# Patient Record
Sex: MALE | Race: WHITE | NOT HISPANIC OR LATINO | Employment: FULL TIME | URBAN - METROPOLITAN AREA
[De-identification: names, ages, dates, MRNs, and addresses within clinical notes are randomized per-mention and may not be internally consistent; named-entity substitution may affect disease eponyms.]

---

## 2017-08-31 ENCOUNTER — ALLSCRIPTS OFFICE VISIT (OUTPATIENT)
Dept: OTHER | Facility: OTHER | Age: 27
End: 2017-08-31

## 2017-10-20 ENCOUNTER — ALLSCRIPTS OFFICE VISIT (OUTPATIENT)
Dept: OTHER | Facility: OTHER | Age: 27
End: 2017-10-20

## 2018-01-11 NOTE — PROGRESS NOTES
Assessment    1  Encounter for preventive health examination (V70 0) (Z00 00)    Plan  [facility] is a fine [facility]  He will contact me if he needs anything between now and boot camp  I wished him well, and will see him if he returns to the [facility]  Chief Complaint  physical      History of Present Illness  HM, Adult Male: The patient is being seen for a health maintenance evaluation  The last health maintenance visit was 1 year(s) ago  General Health: The patient's health since the last visit is described as good  He has regular dental visits  He denies vision problems  He denies hearing loss  Immunizations status: not up to date  Lifestyle:  He consumes a diverse and healthy diet  He does not have any weight concerns  He exercises regularly  He does not use tobacco  He consumes alcohol  He denies drug use  Reproductive health:  the patient is sexually active  Screening:      Review of Systems    Constitutional: No fever or chills, feels well, no tiredness, no recent weight gain or weight loss  Eyes: No complaints of eye pain, no red eyes, no discharge from eyes, no itchy eyes  ENT: no complaints of earache, no hearing loss, no nosebleeds, no nasal discharge, no sore throat, no hoarseness  Cardiovascular: No complaints of slow heart rate, no fast heart rate, no chest pain, no palpitations, no leg claudication, no lower extremity  Respiratory: No complaints of shortness of breath, no wheezing, no cough, no SOB on exertion, no orthopnea or PND  Gastrointestinal: No complaints of abdominal pain, no constipation, no nausea or vomiting, no diarrhea or bloody stools  Genitourinary: No complaints of dysuria, no incontinence, no hesitancy, no nocturia, no genital lesion, no testicular pain  Musculoskeletal: No complaints of arthralgia, no myalgias, no joint swelling or stiffness, no limb pain or swelling     Integumentary: No complaints of skin rash or skin lesions, no itching, no skin wound, no dry skin  Neurological: No compliants of headache, no confusion, no convulsions, no numbness or tingling, no dizziness or fainting, no limb weakness, no difficulty walking  Psychiatric: Is not suicidal, no sleep disturbances, no anxiety or depression, no change in personality, no emotional problems  Endocrine: No complaints of proptosis, no hot flashes, no muscle weakness, no erectile dysfunction, no deepening of the voice, no feelings of weakness  Hematologic/Lymphatic: No complaints of swollen glands, no swollen glands in the neck, does not bleed easily, no easy bruising  Past Medical History    · History of bradycardia (V12 50) (Z86 79)   · History of hyperglycemia (V12 29) (Z86 39)   · History of Near syncope (780 2) (R55)    Surgical History    · History of Elective Circumcision   · History of Laparoscopic Appendectomy   · History of Open Treatment Of Fracture Of One Metacarpal Bone   · History of Tonsillectomy With Adenoidectomy    Family History  Mother    · Family history of Myocardial infarction involving other coronary artery   · Family history of Primary fibromyalgia  Maternal Grandmother    · Family history of type 2 diabetes mellitus (V18 0) (Z83 3)  Maternal Grandfather    · Family history of type 2 diabetes mellitus (V18 0) (Z83 3)   · Family history of Myocardial infarction involving other coronary artery  Family History    · Family history of hypertension (V17 49) (Z82 49)    Social History    · Alcohol use (V49 89) (Z78 9)   · Chewing tobacco use (305 1) (Z72 0)   · Denied: History of Drug use   · Never a smoker   · Pets/Animals: Dog   · Single    Current Meds   1  No Reported Medications Recorded    Allergies    1   Penicillins    Vitals   Recorded: [id number] 40:45NM   Systolic 555   Diastolic 78   Height 5 ft 9 in   Weight 193 lb    BMI Calculated 28 5   BSA Calculated 2 03     Physical Exam    Constitutional   General appearance: No acute distress, well appearing and well nourished  Eyes   Conjunctiva and lids: No erythema, swelling or discharge  Pupils and irises: Equal, round, reactive to light  Ears, Nose, Mouth, and Throat   External inspection of ears and nose: Normal     Otoscopic examination: Tympanic membranes translucent with normal light reflex  Canals patent without erythema  Hearing: Normal     Nasal mucosa, septum, and turbinates: Normal without edema or erythema  Lips, teeth, and gums: Normal, good dentition  Oropharynx: Normal with no erythema, edema, exudate or lesions  Neck   Neck: Supple, symmetric, trachea midline, no masses  Thyroid: Normal, no thyromegaly  Pulmonary   Respiratory effort: No increased work of breathing or signs of respiratory distress  Percussion of chest: Normal     Palpation of chest: Normal     Auscultation of lungs: Clear to auscultation  Cardiovascular   Palpation of heart: Normal PMI, no thrills  Auscultation of heart: Normal rate and rhythm, normal S1 and S2, no murmurs  Carotid pulses: 2+ bilaterally  Abdominal aorta: Normal     Femoral pulses: 2+ bilaterally  Pedal pulses: 2+ bilaterally  Examination of extremities for edema and/or varicosities: Normal     Chest   Breasts: Normal, no dimpling or skin changes appreciated  Palpation of breasts and axillae: Normal, no masses palpated  Chest: Normal     Abdomen   Abdomen: Non-tender, no masses  Liver and spleen: No hepatomegaly or splenomegaly  Examination for hernias: No hernias appreciated  Lymphatic   Palpation of lymph nodes in neck: No lymphadenopathy  Musculoskeletal   Gait and station: Normal     Inspection/palpation of digits and nails: Normal without clubbing or cyanosis  Inspection/palpation of joints, bones, and muscles: Normal     Range of motion: Normal     Stability: Normal     Muscle strength/tone: Normal     Skin   Skin and subcutaneous tissue: Normal without rashes or lesions      Palpation of skin and subcutaneous tissue: Normal turgor  Neurologic   Cranial nerves: Cranial nerves 2-12 intact  Reflexes: 2+ and symmetric  Sensation: No sensory loss  Psychiatric   Judgment and insight: Normal     Orientation to person, place and time: Normal     Recent and remote memory: Intact  Mood and affect: Normal        Results/Data  Reviewed records from his hospitalization in 2009       Signatures   Electronically signed by : Leola Chen DO; Oct 20 2017  1:36PM EST                       (Author)

## 2018-01-13 NOTE — RESULT NOTES
Verified Results  (Q) CBC (H/H, RBC, INDICES, WBC, PLT) [id number] 12:00AM Suzzette Castleman     Test Name Result Flag Reference   WHITE BLOOD CELL COUNT 6 4 Thousand/uL  3 8-10 8   RED BLOOD CELL COUNT 4 94 Million/uL  4 20-5 80   HEMOGLOBIN 14 7 g/dL  13 2-17 1   HEMATOCRIT 45 8 %  38 5-50 0   MCV 92 7 fL  80 0-100 0   MCH 29 8 pg  27 0-33 0   MCHC 32 2 g/dL  32 0-36 0   RDW 13 4 %  11 0-15 0   PLATELET COUNT 250 Thousand/uL  140-400   MPV 9 7 fL  7 5-11 5     (Q) COMPREHENSIVE METABOLIC PNL W/O CO2, ALT, eGFR [id number] 12:00AM Suzzette Castleman     Test Name Result Flag Reference   GLUCOSE 85 mg/dL  65-99   Fasting reference interval   UREA NITROGEN (BUN) 15 mg/dL  7-25   CREATININE 1 06 mg/dL  0 60-1 35   BUN/CREATININE RATIO   2-11   NOT APPLICABLE (calc)   SODIUM 138 mmol/L  135-146   POTASSIUM 4 4 mmol/L  3 5-5 3   CHLORIDE 103 mmol/L     CALCIUM 9 8 mg/dL  8 6-10 3   PROTEIN, TOTAL 7 2 g/dL  6 1-8 1   ALBUMIN 5 0 g/dL  3 6-5 1   GLOBULIN 2 2 g/dL (calc)  1 9-3 7   ALBUMIN/GLOBULIN RATIO 2 3 (calc)  1 0-2 5   BILIRUBIN, TOTAL 0 8 mg/dL  0 2-1 2   ALKALINE PHOSPHATASE 53 U/L     AST 19 U/L  10-40     (Q) HEMOGLOBIN A1c WITH eAG [id number] 12:00AM Suzzette Castleman     Test Name Result Flag Reference   HEMOGLOBIN A1c 5 6 % of total Hgb  <5 7   According to ADA guidelines, hemoglobin A1c <7 0%  represents optimal control in non-pregnant diabetic  patients  Different metrics may apply to specific  patient populations  Standards of Medical Care in    Diabetes Care  2013;36:s11-s66     For the purpose of screening for the presence of  diabetes  <5 7%       Consistent with the absence of diabetes  5 7-6 4%    Consistent with increased risk for diabetes              (prediabetes)  >or=6 5%    Consistent with diabetes     This assay result is consistent with a decreased risk  of diabetes  Currently, no consensus exists for use of hemoglobin  A1c for diagnosis of diabetes for children     eAG (mg/dL) 114 (calc)     eAG (mmol/L) 6 3 (calc)       (Q) LIPID PANEL WITH DIRECT LDL [id number] 12:00AM Dane Spice     Test Name Result Flag Reference   CHOLESTEROL, TOTAL 179 mg/dL  125-200   HDL CHOLESTEROL 55 mg/dL  > OR = 40   TRIGLICERIDES 87 mg/dL  <347   DIRECT  mg/dL  <130   Desirable range <100 mg/dL for patients with CHD or  diabetes and <70 mg/dL for diabetic patients with  known heart disease  CHOL/HDLC RATIO 3 3 (calc)  < OR = 5 0   NON HDL CHOLESTEROL 124 mg/dL (calc)     Target for non-HDL cholesterol is 30 mg/dL higher than   LDL cholesterol target  (Q) TSH, 3RD GENERATION [id number] 12:00AM Dane Spice     Test Name Result Flag Reference   TSH 3 20 mIU/L  0 40-4 50     (1) T4, FREE [id number] 12:00AM Dane Spice     Test Name Result Flag Reference   T4, FREE 1 2 ng/dL  0 8-1 8     (1) URINALYSIS w URINE C/S REFLEX (will reflex a microscopy if leukocytes, occult blood, or nitrites are not within normal limits) [id number] 12:00AM Dane Spice     Test Name Result Flag Reference   COLOR YELLOW  YELLOW   APPEARANCE CLEAR  CLEAR   SPECIFIC GRAVITY 1 030  1 001-1 035   PH 6 5  5 0-8 0   GLUCOSE NEGATIVE  NEGATIVE   BILIRUBIN NEGATIVE  NEGATIVE   KETONES NEGATIVE  NEGATIVE   OCCULT BLOOD NEGATIVE  NEGATIVE   PROTEIN TRACE A NEGATIVE   NITRITE NEGATIVE  NEGATIVE   LEUKOCYTE ESTERASE NEGATIVE  NEGATIVE   WBC NONE SEEN /HPF  < OR = 5   RBC 0-2 /HPF  < OR = 2   SQUAMOUS EPITHELIAL CELLS NONE SEEN /HPF  < OR = 5   BACTERIA NONE SEEN /HPF  NONE SEEN   HYALINE CAST NONE SEEN /LPF  NONE SEEN   REFLEXIVE URINE CULTURE NO CULTURE INDICATED         Discussion/Summary   lab all normal, follow up with cardiology  sugars are good , he is not diabetic

## 2018-01-13 NOTE — PROGRESS NOTES
Assessment    1  Near syncope (780 2) (R55)   2  Hyperglycemia (790 29) (R73 9)   3  Bradycardia (427 89) (R00 1)    Plan  Bradycardia    · *1 - [facility] (CARDIOLOGY ) Physician Referral  Consult  Status:  Active  Requested for: [id number]  Care Summary provided  : Yes  Hyperglycemia    · (1) HEMOGLOBIN A1C; Status:Active; Requested for:04Mar2016;    · (1) URINALYSIS w URINE C/S REFLEX (will reflex a microscopy if leukocytes, occult  blood, or nitrites are not within normal limits); Status:Active; Requested for:04Mar2016;   Near syncope    · (1) CBC/PLT/DIFF; Status:Active; Requested for:04Mar2016;    · (1) COMPREHENSIVE METABOLIC PANEL; Status:Active; Requested for:04Mar2016;    · (1) LIPID PANEL, FASTING; Status:Active; Requested for:04Mar2016;    · (1) T4, FREE; Status:Active; Requested for:04Mar2016;    · (1) TSH; Status:Active; Requested for:04Mar2016;    · EKG/ECG- POC; Status:Active; Requested for:04Mar2016; With relative bradycardia, will refer him to cardiology although I do not think this is a true cause of his near syncope  Recheck in the office in one month  Chief Complaint  PT IS CONCERNED ABOUT POSS  LOW BLOOD SUGARS  HE STATES HE HAS EPISODES OF GETTING [address]  AND HAVING A FEELING THAT HE MAY PASS OUT  THIS HAS BEEN FOR YRS  HAS BECOME MORE FREQUENT  History of Present Illness  HPI: Patient is a 74-year-old male presented to the office as a new patient  Was a former patient, but has not been seen in this office in over 8 years  He works as a   He was in the [employer]  Chief complaint today is of feeling lightheaded and "almost passing out" multiple times over the last several years  Used to happen about once every 6 months, now happens almost weekly  Episodes usually resolves after he eats something, but he cannot categorically states that this happens if he skips meals or has a prolonged period of time between meals   Both grandparents on his mother's side are diabetic  Presyncope: The patient is being seen for an initial evaluation of presyncope  Symptoms:  nausea, but no sense of impending faint, no loss of consciousness, no vertigo, no visual disturbance, no diaphoresis, no vomiting, no abdominal pain, no chest pain, no palpitations, no shortness of breath, no headache, no fatigue, no anxiety and no panic    The patient presents with complaints of gradual onset of intermittent episodes of lightheadedness, described as lightheadedness  His symptoms are caused by no known event  Previous Evaluation: Episodes get better if he eats something  The patient is currently experiencing symptoms  Review of Systems    Constitutional: No fever or chills, feels well, no tiredness, no recent weight gain or weight loss  Eyes: No complaints of eye pain, no red eyes, no discharge from eyes, no itchy eyes  ENT: no complaints of earache, no hearing loss, no nosebleeds, no nasal discharge, no sore throat, no hoarseness  Cardiovascular: No complaints of slow heart rate, no fast heart rate, no chest pain, no palpitations, no leg claudication, no lower extremity  Respiratory: No complaints of shortness of breath, no wheezing, no cough, no SOB on exertion, no orthopnea or PND  Gastrointestinal: No complaints of abdominal pain, no constipation, no nausea or vomiting, no diarrhea or bloody stools  Genitourinary: No complaints of dysuria, no incontinence, no hesitancy, no nocturia, no genital lesion, no testicular pain  Musculoskeletal: No complaints of arthralgia, no myalgias, no joint swelling or stiffness, no limb pain or swelling  Integumentary: No complaints of skin rash or skin lesions, no itching, no skin wound, no dry skin  Neurological: as noted in HPI  Psychiatric: Is not suicidal, no sleep disturbances, no anxiety or depression, no change in personality, no emotional problems     Endocrine: No complaints of proptosis, no hot flashes, no muscle weakness, no erectile dysfunction, no deepening of the voice, no feelings of weakness  Hematologic/Lymphatic: No complaints of swollen glands, no swollen glands in the neck, does not bleed easily, no easy bruising  Surgical History    · History of Elective Circumcision   · History of Laparoscopic Appendectomy   · History of Open Treatment Of Fracture Of One Metacarpal Bone   · History of Tonsillectomy With Adenoidectomy    Family History    · Family history of type 2 diabetes mellitus (V18 0) (Z83 3)    · Family history of type 2 diabetes mellitus (V18 0) (Z83 3)    · Family history of hypertension (V17 49) (Z82 49)    Social History    · Alcohol use (V49 89) (Z78 9)   · Denied: History of Drug use   · Never a smoker    Current Meds   1  No Reported Medications Recorded    Allergies    1  Penicillins    Vitals   Recorded: [id number] 10:33AM Recorded: [id number] 09:51AM   Heart Rate 48    Respiration 16    Systolic  967   Diastolic  72   Height  5 ft 9 in   Weight  187 lb 2 oz   BMI Calculated  27 63   BSA Calculated  2 01     Physical Exam    Constitutional   General appearance: No acute distress, well appearing and well nourished  Eyes   Conjunctiva and lids: No erythema, swelling or discharge  Pupils and irises: Equal, round, reactive to light  Ears, Nose, Mouth, and Throat   External inspection of ears and nose: Normal     Otoscopic examination: Tympanic membranes translucent with normal light reflex  Canals patent without erythema  Hearing: Normal     Nasal mucosa, septum, and turbinates: Normal without edema or erythema  Lips, teeth, and gums: Normal, good dentition  Oropharynx: Normal with no erythema, edema, exudate or lesions  Neck   Neck: Supple, symmetric, trachea midline, no masses  Thyroid: Normal, no thyromegaly  Pulmonary   Respiratory effort: No increased work of breathing or signs of respiratory distress      Percussion of chest: Normal     Palpation of chest: Normal     Auscultation of lungs: Clear to auscultation  Cardiovascular   Palpation of heart: Normal PMI, no thrills  Auscultation of heart: Normal rate and rhythm, normal S1 and S2, no murmurs  Carotid pulses: 2+ bilaterally  Abdominal aorta: Normal     Femoral pulses: 2+ bilaterally  Pedal pulses: 2+ bilaterally  Examination of extremities for edema and/or varicosities: Normal     Chest   Chest: Normal     Genitourinary   Digital rectal exam of prostate: Normal size, no masses  Lymphatic   Palpation of lymph nodes in neck: No lymphadenopathy  Musculoskeletal   Gait and station: Normal     Inspection/palpation of digits and nails: Normal without clubbing or cyanosis  Inspection/palpation of joints, bones, and muscles: Normal     Range of motion: Normal     Stability: Normal     Muscle strength/tone: Normal     Skin   Skin and subcutaneous tissue: Normal without rashes or lesions  Palpation of skin and subcutaneous tissue: Normal turgor  Neurologic   Cranial nerves: Cranial nerves 2-12 intact  Reflexes: 2+ and symmetric  Sensation: No sensory loss  Psychiatric   Judgment and insight: Normal     Orientation to person, place and time: Normal     Recent and remote memory: Intact  Mood and affect: Normal        Results/Data  A 12 lead ECG was performed and was normal  No acute ischemia  Rhythm and rate: sinus bradycardia  P-waves:   the P wave is normal    QRS: the QRS is normal   ST segment: the ST segments are normal    Comparison to prior ECGs: No prior available for comparison      Signatures   Electronically signed by : Christy Roman DO; Mar  4 2016 10:34AM EST                       (Author)

## 2018-01-15 VITALS
BODY MASS INDEX: 28.58 KG/M2 | SYSTOLIC BLOOD PRESSURE: 118 MMHG | HEIGHT: 69 IN | DIASTOLIC BLOOD PRESSURE: 78 MMHG | WEIGHT: 193 LBS

## 2018-01-15 NOTE — PROGRESS NOTES
Assessment    1  Encounter for preventive health examination (V70 0) (Z00 00)    Plan  Health Maintenance    · Call [phone] if: You have any warning signs of skin cancer ; Status:Complete;    Done: [id number] 02:21PM   · Always use a seat belt and shoulder strap when riding or driving a motor vehicle ;  Status:Complete;   Done: [id number] 02:21PM   · Begin or continue regular aerobic exercise  Gradually work up to at least 3 sessions of 30  minutes of exercise a week ; Status:Complete;   Done: [id number] 02:21PM   · Brush your teeth 3 times a day and floss at least once a day ; Status:Complete;   Done:  [id number] 02:21PM   · Eat a low fat and low cholesterol diet ; Status:Complete;   Done: [id number] 02:21PM   · Put a smoke detector in your living area to warn you in case of fire ; Status:Complete;    Done: [id number] 02:21PM   · Some eating tips that can help you lose weight ; Status:Complete;   Done: 20Mfv4753  02:21PM   · Stretch and warm up your muscles during the first 10 minutes , then cool down your  muscles for the last 10 minutes of exercise ; Status:Complete;   Done: [id number]  02:21PM   · There are ways to decrease your stress and improve your sense of well-being  We  encourage you to keep active and exercise regularly  Make time to take care of yourself  and participate in activities that you enjoy  Stay connected to friends and family that can  support and comfort you  If at any time you have thoughts of harming yourself or  someone else, contact us immediately ; Status:Active; Requested for:[id number];    · We encourage all of our patients to exercise regularly  30 minutes of exercise or physical  activity five or more days a week is recommended for children and adults ;  Status:Complete;   Done: 69Zky5927 02:21PM   · We recommend routine visits to a dentist ; Status:Complete;   Done: [id number]  02:21PM    Recheck in the office in 1-2 years  Call the office if there are any problems  Discussion/Summary  I reassured him that since he has no symptoms aszika, he does not need testing  Chief Complaint  well adult      History of Present Illness  HM, Adult Male:   General Health:   Lifestyle:  He consumes a diverse and healthy diet  He does not use tobacco  He consumes alcohol  He typically drinks beer  He denies drug use  Reproductive health:  the patient is sexually active  birth control is not being practiced  Screening:      Review of Systems    Constitutional: He and his wife were recently in the [facility]  They would like to get pregnant and she is concerned about the Zetia virus  The patient denies having any type of ocular, muscle, or joint symptoms  , but No fever or chills, feels well, no tiredness, no recent weight gain or weight loss  Eyes: No complaints of eye pain, no red eyes, no discharge from eyes, no itchy eyes  ENT: no complaints of earache, no hearing loss, no nosebleeds, no nasal discharge, no sore throat, no hoarseness  Cardiovascular: No complaints of slow heart rate, no fast heart rate, no chest pain, no palpitations, no leg claudication, no lower extremity  Respiratory: No complaints of shortness of breath, no wheezing, no cough, no SOB on exertion, no orthopnea or PND  Gastrointestinal: No complaints of abdominal pain, no constipation, no nausea or vomiting, no diarrhea or bloody stools  Genitourinary: No complaints of dysuria, no incontinence, no hesitancy, no nocturia, no genital lesion, no testicular pain  Musculoskeletal: No complaints of arthralgia, no myalgias, no joint swelling or stiffness, no limb pain or swelling  Integumentary: No complaints of skin rash or skin lesions, no itching, no skin wound, no dry skin  Neurological: No compliants of headache, no confusion, no convulsions, no numbness or tingling, no dizziness or fainting, no limb weakness, no difficulty walking     Psychiatric: Is not suicidal, no sleep disturbances, no anxiety or depression, no change in personality, no emotional problems  Endocrine: No complaints of proptosis, no hot flashes, no muscle weakness, no erectile dysfunction, no deepening of the voice, no feelings of weakness  Hematologic/Lymphatic: No complaints of swollen glands, no swollen glands in the neck, does not bleed easily, no easy bruising  Active Problems    1  Bradycardia (427 89) (R00 1)   2  Hyperglycemia (790 29) (R73 9)   3  Near syncope (780 2) (R55)    Surgical History    · History of Elective Circumcision   · History of Laparoscopic Appendectomy   · History of Open Treatment Of Fracture Of One Metacarpal Bone   · History of Tonsillectomy With Adenoidectomy    Family History  Mother    · Family history of Myocardial infarction involving other coronary artery   · Family history of Primary fibromyalgia  Maternal Grandmother    · Family history of type 2 diabetes mellitus (V18 0) (Z83 3)  Maternal Grandfather    · Family history of type 2 diabetes mellitus (V18 0) (Z83 3)   · Family history of Myocardial infarction involving other coronary artery  Family History    · Family history of hypertension (V17 49) (Z82 49)    Social History    · Alcohol use (V49 89) (Z78 9)   · Chewing tobacco use (305 1) (Z72 0)   · Denied: History of Drug use   · Never a smoker   · Pets/Animals: Dog   · Single    Current Meds   1  No Reported Medications Recorded    Allergies    1  Penicillins    Vitals   Recorded: 99KSG1997 52:69JY   Systolic 626   Diastolic 78   Height 5 ft 9 in   Weight 187 lb    BMI Calculated 27 62   BSA Calculated 2 01     Physical Exam    Constitutional   General appearance: No acute distress, well appearing and well nourished  Eyes   Conjunctiva and lids: No erythema, swelling or discharge  Pupils and irises: Equal, round, reactive to light  Ophthalmoscopic examination: Normal fundi and optic discs      Ears, Nose, Mouth, and Throat   External inspection of ears and nose: Normal  Otoscopic examination: Tympanic membranes translucent with normal light reflex  Canals patent without erythema  Hearing: Normal     Nasal mucosa, septum, and turbinates: Normal without edema or erythema  Lips, teeth, and gums: Normal, good dentition  Oropharynx: Normal with no erythema, edema, exudate or lesions  Neck   Neck: Supple, symmetric, trachea midline, no masses  Thyroid: Normal, no thyromegaly  Pulmonary   Respiratory effort: No increased work of breathing or signs of respiratory distress  Percussion of chest: Normal     Palpation of chest: Normal     Auscultation of lungs: Clear to auscultation  Cardiovascular   Palpation of heart: Normal PMI, no thrills  Auscultation of heart: Normal rate and rhythm, normal S1 and S2, no murmurs  Carotid pulses: 2+ bilaterally  Abdominal aorta: Normal     Femoral pulses: 2+ bilaterally  Pedal pulses: 2+ bilaterally  Examination of extremities for edema and/or varicosities: Normal     Chest   Breasts: Normal, no dimpling or skin changes appreciated  Palpation of breasts and axillae: Normal, no masses palpated  Chest: Normal     Abdomen   Abdomen: Non-tender, no masses  Liver and spleen: No hepatomegaly or splenomegaly  Examination for hernias: No hernias appreciated  Anus, perineum, and rectum: Normal sphincter tone, no masses, no prolapse  Stool sample for occult blood: Negative  Genitourinary   Scrotal contents: Normal testes, no masses  Penis: Normal, no lesions  Digital rectal exam of prostate: Normal size, no masses  Lymphatic   Palpation of lymph nodes in neck: No lymphadenopathy  Palpation of lymph nodes in axillae: No lymphadenopathy  Palpation of lymph nodes in groin: No lymphadenopathy  Palpation of lymph nodes in other areas: No lymphadenopathy  Musculoskeletal   Gait and station: Normal     Inspection/palpation of digits and nails: Normal without clubbing or cyanosis  Inspection/palpation of joints, bones, and muscles: Normal     Range of motion: Normal     Stability: Normal     Muscle strength/tone: Normal     Skin   Skin and subcutaneous tissue: Normal without rashes or lesions  Palpation of skin and subcutaneous tissue: Normal turgor  Neurologic   Cranial nerves: Cranial nerves 2-12 intact  Reflexes: 2+ and symmetric  Sensation: No sensory loss  Psychiatric   Judgment and insight: Normal     Orientation to person, place and time: Normal     Recent and remote memory: Intact      Mood and affect: Normal        Signatures   Electronically signed by : Claudeen Kraft, DO; Aug 31 2017  2:22PM EST                       (Author)

## 2018-01-22 VITALS
HEIGHT: 69 IN | DIASTOLIC BLOOD PRESSURE: 78 MMHG | SYSTOLIC BLOOD PRESSURE: 120 MMHG | WEIGHT: 187 LBS | BODY MASS INDEX: 27.7 KG/M2

## 2018-05-08 ENCOUNTER — TELEPHONE (OUTPATIENT)
Dept: FAMILY MEDICINE CLINIC | Facility: CLINIC | Age: 28
End: 2018-05-08

## 2018-05-08 NOTE — TELEPHONE ENCOUNTER
I thought we did a letter about this  If you can with Re word it just to say that he has been seizure-free for over 10 years in takes no medication I will sign it

## 2018-06-11 ENCOUNTER — OFFICE VISIT (OUTPATIENT)
Dept: FAMILY MEDICINE CLINIC | Facility: CLINIC | Age: 28
End: 2018-06-11
Payer: COMMERCIAL

## 2018-06-11 VITALS
SYSTOLIC BLOOD PRESSURE: 120 MMHG | WEIGHT: 201 LBS | BODY MASS INDEX: 29.77 KG/M2 | DIASTOLIC BLOOD PRESSURE: 82 MMHG | HEIGHT: 69 IN

## 2018-06-11 DIAGNOSIS — Z87.898 HISTORY OF SEIZURE: Primary | ICD-10-CM

## 2018-06-11 PROCEDURE — 1036F TOBACCO NON-USER: CPT | Performed by: FAMILY MEDICINE

## 2018-06-11 PROCEDURE — 99212 OFFICE O/P EST SF 10 MIN: CPT | Performed by: FAMILY MEDICINE

## 2018-06-11 PROCEDURE — 3008F BODY MASS INDEX DOCD: CPT | Performed by: FAMILY MEDICINE

## 2018-06-11 NOTE — PROGRESS NOTES
Assessment/Plan:         Diagnoses and all orders for this visit:    History of seizure          Subjective:   Chief Complaint   Patient presents with    Follow-up     paper work for the [employer]           Patient ID: Stephanie Roman is a 34 y o  male  Patient is a 74-year-old male presenting to the office for follow-up on his health history  He is trying to get into [employer], and there is a vague history of him having had a seizure  In reviewing the record, prior to him becoming are patient he had a solitary seizure, that was felt to be a combination of fatigue, sleep deprivation, and dehydration  He was placed on Tegretol at that time, had follow up with Neurology  Had a  EEG, was weaned off of the Tegretol and has never had a another seizure  He would also 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  His wife is due in July  The following portions of the patient's history were reviewed and updated as appropriate: allergies, current medications, past family history, past medical history, past social history, past surgical history and problem list     Review of Systems   Constitutional: Negative for appetite change, chills, diaphoresis, fatigue, fever and unexpected weight change  HENT: Negative for congestion, ear pain, hearing loss, nosebleeds, postnasal drip, rhinorrhea, sinus pressure, sore throat, tinnitus and trouble swallowing  Eyes: Negative for photophobia, pain, discharge, redness, itching and visual disturbance  Respiratory: Negative for cough, chest tightness, shortness of breath and wheezing  Cardiovascular: Negative for chest pain, palpitations and leg swelling  Denies orthopnea , dyspnea on exertion   Gastrointestinal: Negative for abdominal distention, abdominal pain, blood in stool, constipation, diarrhea, nausea and vomiting  Endocrine: Negative  Genitourinary: Negative for difficulty urinating, dysuria, flank pain, frequency, hematuria and urgency  Denies nocturia , erectile dysfunction   Musculoskeletal: Negative for arthralgias, back pain, gait problem, joint swelling and myalgias  Skin: Negative for pallor, rash and wound  Denies skin lesions   Allergic/Immunologic: Negative for environmental allergies, food allergies and immunocompromised state  Neurological: Negative for dizziness, tremors, seizures, syncope, speech difficulty, weakness, numbness and headaches  Hematological: Negative for adenopathy  Does not bruise/bleed easily  Psychiatric/Behavioral: Negative for behavioral problems, confusion, sleep disturbance and suicidal ideas  The patient is not nervous/anxious  Objective:      /82   Ht 5' 9" (1 753 m)   Wt 91 2 kg (201 lb)   BMI 29 68 kg/m²          Physical Exam   Constitutional: He is oriented to person, place, and time  He appears well-developed and well-nourished  HENT:   Head: Normocephalic and atraumatic  Nose: Nose normal    Mouth/Throat: Oropharynx is clear and moist    Eyes: Conjunctivae and EOM are normal  Pupils are equal, round, and reactive to light  Neck: Normal range of motion  Neck supple  No thyromegaly present  Cardiovascular: Normal rate, regular rhythm and intact distal pulses  No murmur heard  Pulmonary/Chest: Effort normal and breath sounds normal  He has no wheezes  He has no rales  Abdominal: Soft  Bowel sounds are normal  He exhibits no mass  There is no tenderness  There is no rebound and no guarding  Musculoskeletal: He exhibits no edema, tenderness or deformity  Neurological: He is alert and oriented to person, place, and time  He has normal reflexes  No cranial nerve deficit  He exhibits normal muscle tone  Coordination normal    Skin: Skin is warm and dry  No lesion and no rash noted  Nails show no clubbing  Psychiatric: He has a normal mood and affect   Judgment normal

## 2019-02-06 ENCOUNTER — OFFICE VISIT (OUTPATIENT)
Dept: FAMILY MEDICINE CLINIC | Facility: CLINIC | Age: 29
End: 2019-02-06
Payer: COMMERCIAL

## 2019-02-06 VITALS
SYSTOLIC BLOOD PRESSURE: 122 MMHG | BODY MASS INDEX: 30.07 KG/M2 | WEIGHT: 203 LBS | HEIGHT: 69 IN | DIASTOLIC BLOOD PRESSURE: 80 MMHG | TEMPERATURE: 98 F

## 2019-02-06 DIAGNOSIS — F41.1 GENERALIZED ANXIETY DISORDER: Primary | ICD-10-CM

## 2019-02-06 DIAGNOSIS — J00 COMMON COLD: ICD-10-CM

## 2019-02-06 PROCEDURE — 1036F TOBACCO NON-USER: CPT | Performed by: FAMILY MEDICINE

## 2019-02-06 PROCEDURE — 3008F BODY MASS INDEX DOCD: CPT | Performed by: FAMILY MEDICINE

## 2019-02-06 PROCEDURE — 99214 OFFICE O/P EST MOD 30 MIN: CPT | Performed by: FAMILY MEDICINE

## 2019-02-06 RX ORDER — SERTRALINE HYDROCHLORIDE 25 MG/1
25 TABLET, FILM COATED ORAL DAILY
Qty: 30 TABLET | Refills: 5 | Status: SHIPPED | OUTPATIENT
Start: 2019-02-06 | End: 2019-11-04

## 2019-02-06 NOTE — ASSESSMENT & PLAN NOTE
Will start sertraline  Recommend that he get some counseling, call the mental Health phone number on the back of his insurance card  I spent over 25 minutes with the patient, more than half of this time was spent counseling and coordinating care

## 2019-02-06 NOTE — PROGRESS NOTES
Assessment/Plan:    Generalized anxiety disorder  Will start sertraline  Recommend that he get some counseling, call the mental Health phone number on the back of his insurance card  I spent over 25 minutes with the patient, more than half of this time was spent counseling and coordinating care  Diagnoses and all orders for this visit:    Generalized anxiety disorder  -     sertraline (ZOLOFT) 25 mg tablet; Take 1 tablet (25 mg total) by mouth daily    Common cold  Comments:  May take Mucinex D as needed  Subjective:   Chief Complaint   Patient presents with    Anxiety    Cough    Generalized Body Aches      started las night           Patient ID: Dimple Tariq is a 34 y o  male  Patient is a 28-year-old male presenting to the office complaining of anxiety, and stress  He has a 10month-old daughter at home, recently found out that his wife is pregnant again  Recently got promoted at work, and this is quite stressful to him  He and his wife only see each other on weekends, they work different shifts  They moved less than 4 months ago  There are some other family issues going on with his family  He also has had some nasal congestion and mild cough with possible fever over the last 24 hours  Anxiety   Presents for initial visit  Onset was 1 to 6 months ago  The problem has been gradually worsening  Symptoms include chest pain, excessive worry, insomnia, irritability, malaise, nervous/anxious behavior and restlessness  Patient reports no compulsions, confusion, decreased concentration, depressed mood, dizziness, nausea, obsessions, palpitations, panic, shortness of breath or suicidal ideas  Symptoms occur most days  The severity of symptoms is moderate  The symptoms are aggravated by family issues and work stress  The quality of sleep is fair  Nighttime awakenings: none  Risk factors include family history and a major life event   There is no history of anxiety/panic attacks, asthma, depression or suicide attempts  Past treatments include lifestyle changes (Meditation and yoga)  The treatment provided no relief  Compliance with prior treatments has been good  Past compliance problems: None  The following portions of the patient's history were reviewed and updated as appropriate: allergies, current medications, past family history, past medical history, past social history, past surgical history and problem list       Review of Systems   Constitutional: Positive for appetite change, fatigue and irritability  Negative for chills, diaphoresis, fever and unexpected weight change  HENT: Positive for congestion, postnasal drip and rhinorrhea  Negative for ear pain, hearing loss, nosebleeds, sinus pressure, sore throat, tinnitus and trouble swallowing  Eyes: Negative for photophobia, pain, discharge, redness, itching and visual disturbance  Respiratory: Positive for cough  Negative for chest tightness, shortness of breath and wheezing  Cardiovascular: Positive for chest pain  Negative for palpitations and leg swelling  Denies orthopnea , dyspnea on exertion   Gastrointestinal: Negative for abdominal distention, abdominal pain, blood in stool, constipation, diarrhea, nausea and vomiting  Endocrine: Negative  Genitourinary: Negative for difficulty urinating, dysuria, flank pain, frequency, hematuria and urgency  Denies nocturia , erectile dysfunction   Musculoskeletal: Negative for arthralgias, back pain, gait problem, joint swelling and myalgias  Skin: Negative for pallor, rash and wound  Denies skin lesions   Allergic/Immunologic: Negative for environmental allergies, food allergies and immunocompromised state  Neurological: Negative for dizziness, tremors, seizures, syncope, speech difficulty, weakness, numbness and headaches  Hematological: Negative for adenopathy  Does not bruise/bleed easily     Psychiatric/Behavioral: Negative for behavioral problems, confusion, decreased concentration, sleep disturbance and suicidal ideas  The patient is nervous/anxious and has insomnia  Objective:      /80   Temp 98 °F (36 7 °C)   Ht 5' 9" (1 753 m)   Wt 92 1 kg (203 lb)   BMI 29 98 kg/m²          Physical Exam   Constitutional: He is oriented to person, place, and time  He appears well-developed and well-nourished  HENT:   Head: Normocephalic and atraumatic  Nose: Nose normal    Mouth/Throat: Oropharynx is clear and moist    Nasal discharge, rhinorrhea  Eyes: Pupils are equal, round, and reactive to light  Conjunctivae and EOM are normal    Neck: Normal range of motion  Neck supple  No JVD present  No tracheal deviation present  No thyromegaly present  Cardiovascular: Normal rate, regular rhythm and intact distal pulses  No murmur heard  Pulmonary/Chest: Effort normal and breath sounds normal  He has no wheezes  He has no rales  Abdominal: Soft  Bowel sounds are normal  He exhibits no mass  There is no tenderness  There is no rebound and no guarding  Musculoskeletal: He exhibits no edema, tenderness or deformity  Lymphadenopathy:     He has no cervical adenopathy  Neurological: He is alert and oriented to person, place, and time  He has normal reflexes  No cranial nerve deficit  He exhibits normal muscle tone  Coordination normal    Skin: Skin is warm and dry  No lesion and no rash noted  Nails show no clubbing  Psychiatric: Judgment normal  His mood appears anxious  His affect is not labile and not inappropriate  His speech is not rapid and/or pressured, not delayed, not tangential and not slurred  He is withdrawn  He is not agitated, not aggressive, not hyperactive and not combative  Thought content is not paranoid and not delusional  Cognition and memory are normal  He does not exhibit a depressed mood  He is communicative

## 2019-02-07 ENCOUNTER — TELEPHONE (OUTPATIENT)
Dept: FAMILY MEDICINE CLINIC | Facility: CLINIC | Age: 29
End: 2019-02-07

## 2019-02-07 DIAGNOSIS — B34.9 VIRAL SYNDROME: Primary | ICD-10-CM

## 2019-02-07 RX ORDER — BROMPHENIRAMINE MALEATE, PSEUDOEPHEDRINE HYDROCHLORIDE, AND DEXTROMETHORPHAN HYDROBROMIDE 2; 30; 10 MG/5ML; MG/5ML; MG/5ML
5 SYRUP ORAL 4 TIMES DAILY PRN
Qty: 240 ML | Refills: 0 | Status: SHIPPED | OUTPATIENT
Start: 2019-02-07 | End: 2019-11-04

## 2019-02-07 RX ORDER — ONDANSETRON 4 MG/1
4 TABLET, ORALLY DISINTEGRATING ORAL EVERY 8 HOURS PRN
Qty: 20 TABLET | Refills: 0 | Status: SHIPPED | OUTPATIENT
Start: 2019-02-07 | End: 2019-11-04

## 2019-11-04 ENCOUNTER — OFFICE VISIT (OUTPATIENT)
Dept: FAMILY MEDICINE CLINIC | Facility: CLINIC | Age: 29
End: 2019-11-04
Payer: COMMERCIAL

## 2019-11-04 VITALS
DIASTOLIC BLOOD PRESSURE: 78 MMHG | WEIGHT: 209 LBS | SYSTOLIC BLOOD PRESSURE: 118 MMHG | BODY MASS INDEX: 30.96 KG/M2 | HEIGHT: 69 IN

## 2019-11-04 DIAGNOSIS — Z13.6 SCREENING FOR CARDIOVASCULAR CONDITION: ICD-10-CM

## 2019-11-04 DIAGNOSIS — Z13.1 SCREENING FOR DIABETES MELLITUS: ICD-10-CM

## 2019-11-04 DIAGNOSIS — Z23 NEED FOR VACCINATION: ICD-10-CM

## 2019-11-04 DIAGNOSIS — Z00.00 WELL ADULT EXAM: Primary | ICD-10-CM

## 2019-11-04 DIAGNOSIS — B35.1 ONYCHOMYCOSIS: ICD-10-CM

## 2019-11-04 PROBLEM — F41.1 GENERALIZED ANXIETY DISORDER: Status: RESOLVED | Noted: 2019-02-06 | Resolved: 2019-11-04

## 2019-11-04 PROBLEM — Z87.898 HISTORY OF SEIZURE: Status: RESOLVED | Noted: 2018-06-11 | Resolved: 2019-11-04

## 2019-11-04 PROCEDURE — 90471 IMMUNIZATION ADMIN: CPT | Performed by: FAMILY MEDICINE

## 2019-11-04 PROCEDURE — 90686 IIV4 VACC NO PRSV 0.5 ML IM: CPT | Performed by: FAMILY MEDICINE

## 2019-11-04 PROCEDURE — 99395 PREV VISIT EST AGE 18-39: CPT | Performed by: FAMILY MEDICINE

## 2019-11-04 NOTE — PROGRESS NOTES
Assessment/Plan:         Diagnoses and all orders for this visit:    Well adult exam    Onychomycosis  -     ciclopirox (PENLAC) 8 % solution; Apply topically daily at bedtime (Patient not taking: Reported on 11/4/2019)    Screening for cardiovascular condition  -     Comprehensive metabolic panel; Future  -     CBC and differential; Future  -     Lipid panel; Future    Screening for diabetes mellitus  -     Comprehensive metabolic panel; Future  -     Urinalysis with reflex to microscopic    Need for vaccination  -     influenza vaccine, [id number], quadrivalent, 0 5 mL, preservative-free, for adult and pediatric patients 6 mos+ (AFLURIA, FLUARIX, FLULAVAL, FLUZONE)          Subjective:   Chief Complaint   Patient presents with    Physical Exam     per insurance           Patient ID: Claire Collier is a 34 y o  male  Patient is a 74-year-old male presenting to the office for a well checkup  He has not seen an eye doctor since he was in high school  It has been over year since he has been to the dentist   Overall he feels quite well, discontinue the sertraline does not feel anxious anymore  His son was born 7 weeks ago, and he has a 25month-old daughter as well  The following portions of the patient's history were reviewed and updated as appropriate: allergies, current medications, past family history, past medical history, past social history, past surgical history and problem list     Review of Systems   Constitutional: Negative for appetite change, chills, diaphoresis, fatigue, fever and unexpected weight change  HENT: Negative for congestion, ear pain, hearing loss, nosebleeds, postnasal drip, rhinorrhea, sinus pressure, sore throat, tinnitus and trouble swallowing  Eyes: Negative for photophobia, pain, discharge, redness, itching and visual disturbance  Respiratory: Negative for cough, chest tightness, shortness of breath and wheezing      Cardiovascular: Negative for chest pain, palpitations and leg swelling  Denies orthopnea , dyspnea on exertion   Gastrointestinal: Negative for abdominal distention, abdominal pain, blood in stool, constipation, diarrhea, nausea and vomiting  Endocrine: Negative  Genitourinary: Negative for difficulty urinating, dysuria, flank pain, frequency, hematuria and urgency  Denies nocturia , erectile dysfunction   Musculoskeletal: Negative for arthralgias, back pain, gait problem, joint swelling and myalgias  Skin: Negative for pallor, rash and wound  Denies skin lesions   Allergic/Immunologic: Negative for environmental allergies, food allergies and immunocompromised state  Neurological: Negative for dizziness, tremors, seizures, syncope, speech difficulty, weakness, numbness and headaches  Hematological: Negative for adenopathy  Does not bruise/bleed easily  Psychiatric/Behavioral: Negative for behavioral problems, confusion, sleep disturbance and suicidal ideas  The patient is not nervous/anxious  Objective:      /78   Ht 5' 9" (1 753 m)   Wt 94 8 kg (209 lb)   BMI 30 86 kg/m²          Physical Exam   Constitutional: He is oriented to person, place, and time  He appears well-developed and well-nourished  HENT:   Head: Normocephalic and atraumatic  Nose: Nose normal    Mouth/Throat: Oropharynx is clear and moist    Eyes: Pupils are equal, round, and reactive to light  Conjunctivae and EOM are normal    Neck: Normal range of motion  Neck supple  No JVD present  No tracheal deviation present  No thyromegaly present  Cardiovascular: Normal rate, regular rhythm and intact distal pulses  No murmur heard  Pulmonary/Chest: Effort normal and breath sounds normal  He has no wheezes  He has no rales  Abdominal: Soft  Bowel sounds are normal  He exhibits no mass  There is no tenderness  There is no rebound and no guarding  Musculoskeletal: He exhibits no edema, tenderness or deformity     Lymphadenopathy:     He has no cervical adenopathy  Neurological: He is alert and oriented to person, place, and time  He has normal reflexes  He displays normal reflexes  No cranial nerve deficit  He exhibits normal muscle tone  Coordination normal    Skin: Skin is warm and dry  No lesion and no rash noted  Nails show no clubbing  Psychiatric: He has a normal mood and affect   Judgment normal